# Patient Record
Sex: MALE | Race: WHITE
[De-identification: names, ages, dates, MRNs, and addresses within clinical notes are randomized per-mention and may not be internally consistent; named-entity substitution may affect disease eponyms.]

---

## 2018-02-27 NOTE — CT
CT CERVICAL SPINE NONCONTRAST:

2/27/18

 

HISTORY: 

Fall. Neck injury. 

 

FINDINGS:  

Vertebral body heights and alignment are maintained. Cervicothoracic junction is intact. There is two
 thirds shaft width inferior displacement of the posterior fragment of a C7 spinous process fracture.
 Margins of the fracture are well corticated, suggesting that this is an old injury. No other fractur
es are apparent. 

 

IMPRESSION:  

C7 sammy 's fracture, favored to be old. No unstable injury is apparent. 

 

 

 

POS: [facility]

## 2018-02-27 NOTE — CT
CT HEAD NONCONTRAST

2/27/18

 

HISTORY: 

Fall. Head injury. 

 

COMPARISON:  

10/26/10.

 

FINDINGS:  

There is no evidence of acute intracranial hemorrhage or infarct. The ventricles appear normal in siz
e, shape and position. There is no mass effect or shift of midline structures. Mucosal thickening wit
hin the right mastoid air cells and ethmoid air cells are apparent. There are air fluid levels within
 each maxillary sinus. 

 

IMPRESSION:  

1.      No acute intracranial abnormalities are demonstrated. 

2.      Bilateral maxillary sinusitis with chronic appearing pansinusitis. 

 

 

POS: [facility]

## 2018-03-03 NOTE — EKG
Test Reason : 

Blood Pressure : ***/*** mmHG

Vent. Rate : 104 BPM     Atrial Rate : 104 BPM

   P-R Int : 164 ms          QRS Dur : 088 ms

    QT Int : 356 ms       P-R-T Axes : [phone] degrees

   QTc Int : 468 ms

 

Sinus tachycardia with Premature atrial complexes

Otherwise normal ECG

 

Confirmed by JIESON HAWLEY M.D. ([id number]),  ULICES HANNA ([id number]) on 3/3/2018 1:41:55 PM

 

Referred By:             Confirmed By:JEISON HAWLEY M.D.

## 2018-11-17 NOTE — RAD
RADIOGRAPH CHEST 2 VIEWS:

 

Date: 11/17/18

Time: 1220 hours 

 

HISTORY: 

53-year-old male with productive cough for 1 week. 

 

COMPARISON: 

01/10/14. 

 

FINDINGS:

Previously, there was a mild broad infiltrate in the right lower lobe, and to a lesser degree in the 
left lower lobe, which have resolved. 

 

Now, there are new findings of a few flame-shaped small infiltrate-like nodular densities, two in the
 right upper lobe and questionably one in the right lower lung zone, as seen on the frontal view. On 
the lateral view, there is a new finding of a subtle small region of slightly increased attenuation a
t the posterior base of one of the lower lobes. There is no pleural effusion. Cardiomediastinal silho
uette is normal. No hilar enlargement. No pneumothorax. 

 

IMPRESSION:

A few small scattered nodular infiltrates. This is nonspecific, but it could represent multifocal mil
d bronchopneumonia. If the patient is immunocompromised, these could represent opportunistic infectio
ns. Clinical correlation is recommended. 

 

THUY [] 

 

POS: [facility]

## 2022-09-25 ENCOUNTER — HOSPITAL ENCOUNTER (INPATIENT)
Dept: HOSPITAL 92 - ERS | Age: 57
LOS: 2 days | Discharge: HOME | DRG: 196 | End: 2022-09-27
Attending: INTERNAL MEDICINE | Admitting: FAMILY MEDICINE
Payer: COMMERCIAL

## 2022-09-25 VITALS — BODY MASS INDEX: 28.7 KG/M2

## 2022-09-25 DIAGNOSIS — J96.22: ICD-10-CM

## 2022-09-25 DIAGNOSIS — E87.2: ICD-10-CM

## 2022-09-25 DIAGNOSIS — F17.210: ICD-10-CM

## 2022-09-25 DIAGNOSIS — Z20.822: ICD-10-CM

## 2022-09-25 DIAGNOSIS — I10: ICD-10-CM

## 2022-09-25 DIAGNOSIS — F32.A: ICD-10-CM

## 2022-09-25 DIAGNOSIS — J96.21: ICD-10-CM

## 2022-09-25 DIAGNOSIS — Z99.81: ICD-10-CM

## 2022-09-25 DIAGNOSIS — F10.10: ICD-10-CM

## 2022-09-25 DIAGNOSIS — I95.9: ICD-10-CM

## 2022-09-25 DIAGNOSIS — Z79.899: ICD-10-CM

## 2022-09-25 DIAGNOSIS — F41.9: ICD-10-CM

## 2022-09-25 DIAGNOSIS — Z79.52: ICD-10-CM

## 2022-09-25 DIAGNOSIS — Z90.49: ICD-10-CM

## 2022-09-25 DIAGNOSIS — Z91.041: ICD-10-CM

## 2022-09-25 DIAGNOSIS — E11.9: ICD-10-CM

## 2022-09-25 DIAGNOSIS — E78.5: ICD-10-CM

## 2022-09-25 DIAGNOSIS — J84.9: Primary | ICD-10-CM

## 2022-09-25 LAB
ALBUMIN SERPL BCG-MCNC: 3.5 G/DL (ref 3.5–5)
ALP SERPL-CCNC: 133 U/L (ref 40–110)
ALT SERPL W P-5'-P-CCNC: 13 U/L (ref 8–55)
ANALYZER IN CARDIO: (no result)
ANALYZER IN CARDIO: (no result)
ANION GAP SERPL CALC-SCNC: 21 MMOL/L (ref 10–20)
AST SERPL-CCNC: 26 U/L (ref 5–34)
BASE EXCESS STD BLDA CALC-SCNC: -3.6 MEQ/L
BASE EXCESS STD BLDA CALC-SCNC: -3.8 MEQ/L
BILIRUB SERPL-MCNC: 0.5 MG/DL (ref 0.2–1.2)
BUN SERPL-MCNC: 10 MG/DL (ref 8.4–25.7)
CA-I BLDA-SCNC: 1.05 MMOL/L (ref 1.12–1.3)
CA-I BLDA-SCNC: 1.09 MMOL/L (ref 1.12–1.3)
CALCIUM SERPL-MCNC: 8.6 MG/DL (ref 7.8–10.44)
CHLORIDE SERPL-SCNC: 99 MMOL/L (ref 98–107)
CO2 SERPL-SCNC: 20 MMOL/L (ref 22–29)
CREAT CL PREDICTED SERPL C-G-VRATE: 0 ML/MIN (ref 70–130)
GLOBULIN SER CALC-MCNC: 2.7 G/DL (ref 2.4–3.5)
GLUCOSE SERPL-MCNC: 168 MG/DL (ref 70–105)
HCO3 BLDA-SCNC: 23.8 MEQ/L (ref 22–28)
HCO3 BLDA-SCNC: 23.9 MEQ/L (ref 22–28)
HCT VFR BLDA CALC: 29 % (ref 42–52)
HCT VFR BLDA CALC: 31 % (ref 42–52)
HGB BLD-MCNC: 10.4 G/DL (ref 14–18)
HGB BLDA-MCNC: 10.4 G/DL (ref 14–18)
HGB BLDA-MCNC: 9.8 G/DL (ref 14–18)
MCH RBC QN AUTO: 26.6 PG (ref 27–31)
MCV RBC AUTO: 85.8 FL (ref 78–98)
MDIFF COMPLETE?: YES
O2 A-A PPRESDIFF RESPIRATORY: 176.57 MMHG (ref 0–20)
OVALOCYTES BLD QL SMEAR: (no result) (100X)
PCO2 BLDA: 55.7 MMHG (ref 35–45)
PCO2 BLDA: 55.8 MMHG (ref 35–45)
PH BLDA: 7.25 [PH] (ref 7.35–7.45)
PH BLDA: 7.25 [PH] (ref 7.35–7.45)
PLATELET # BLD AUTO: 439 THOU/UL (ref 130–400)
PO2 BLDA: 110.3 MMHG (ref 80–100)
PO2 BLDA: 138.9 MMHG (ref 80–100)
POLYCHROMASIA BLD QL SMEAR: (no result) (100X)
POTASSIUM BLD-SCNC: 3.34 MMOL/L (ref 3.7–5.3)
POTASSIUM BLD-SCNC: 3.6 MMOL/L (ref 3.7–5.3)
POTASSIUM SERPL-SCNC: 3.8 MMOL/L (ref 3.5–5.1)
RBC # BLD AUTO: 3.92 MILL/UL (ref 4.7–6.1)
SODIUM SERPL-SCNC: 136 MMOL/L (ref 136–145)
SP GR UR STRIP: 1.01 (ref 1–1.04)
SPECIMEN DRAWN FROM PATIENT: (no result)
SPECIMEN DRAWN FROM PATIENT: (no result)
STOMATOCYTES BLD QL SMEAR: (no result) (100X)
WBC # BLD AUTO: 9.2 THOU/UL (ref 4.8–10.8)

## 2022-09-25 PROCEDURE — 84145 PROCALCITONIN (PCT): CPT

## 2022-09-25 PROCEDURE — 3E043XZ INTRODUCTION OF VASOPRESSOR INTO CENTRAL VEIN, PERCUTANEOUS APPROACH: ICD-10-PCS | Performed by: EMERGENCY MEDICINE

## 2022-09-25 PROCEDURE — 83735 ASSAY OF MAGNESIUM: CPT

## 2022-09-25 PROCEDURE — 80048 BASIC METABOLIC PNL TOTAL CA: CPT

## 2022-09-25 PROCEDURE — 96375 TX/PRO/DX INJ NEW DRUG ADDON: CPT

## 2022-09-25 PROCEDURE — 36416 COLLJ CAPILLARY BLOOD SPEC: CPT

## 2022-09-25 PROCEDURE — 51702 INSERT TEMP BLADDER CATH: CPT

## 2022-09-25 PROCEDURE — 93005 ELECTROCARDIOGRAM TRACING: CPT

## 2022-09-25 PROCEDURE — 96365 THER/PROPH/DIAG IV INF INIT: CPT

## 2022-09-25 PROCEDURE — 87449 NOS EACH ORGANISM AG IA: CPT

## 2022-09-25 PROCEDURE — 82805 BLOOD GASES W/O2 SATURATION: CPT

## 2022-09-25 PROCEDURE — 78451 HT MUSCLE IMAGE SPECT SING: CPT

## 2022-09-25 PROCEDURE — 96376 TX/PRO/DX INJ SAME DRUG ADON: CPT

## 2022-09-25 PROCEDURE — 93970 EXTREMITY STUDY: CPT

## 2022-09-25 PROCEDURE — 87086 URINE CULTURE/COLONY COUNT: CPT

## 2022-09-25 PROCEDURE — 83605 ASSAY OF LACTIC ACID: CPT

## 2022-09-25 PROCEDURE — 87040 BLOOD CULTURE FOR BACTERIA: CPT

## 2022-09-25 PROCEDURE — 5A09357 ASSISTANCE WITH RESPIRATORY VENTILATION, LESS THAN 24 CONSECUTIVE HOURS, CONTINUOUS POSITIVE AIRWAY PRESSURE: ICD-10-PCS | Performed by: EMERGENCY MEDICINE

## 2022-09-25 PROCEDURE — 80053 COMPREHEN METABOLIC PANEL: CPT

## 2022-09-25 PROCEDURE — 71045 X-RAY EXAM CHEST 1 VIEW: CPT

## 2022-09-25 PROCEDURE — 85025 COMPLETE CBC W/AUTO DIFF WBC: CPT

## 2022-09-25 PROCEDURE — U0002 COVID-19 LAB TEST NON-CDC: HCPCS

## 2022-09-25 PROCEDURE — 80202 ASSAY OF VANCOMYCIN: CPT

## 2022-09-25 PROCEDURE — 36600 WITHDRAWAL OF ARTERIAL BLOOD: CPT

## 2022-09-25 PROCEDURE — 36556 INSERT NON-TUNNEL CV CATH: CPT

## 2022-09-25 PROCEDURE — 81003 URINALYSIS AUTO W/O SCOPE: CPT

## 2022-09-25 PROCEDURE — 84484 ASSAY OF TROPONIN QUANT: CPT

## 2022-09-25 PROCEDURE — 94640 AIRWAY INHALATION TREATMENT: CPT

## 2022-09-25 PROCEDURE — A9540 TC99M MAA: HCPCS

## 2022-09-25 PROCEDURE — 70450 CT HEAD/BRAIN W/O DYE: CPT

## 2022-09-25 PROCEDURE — 87324 CLOSTRIDIUM AG IA: CPT

## 2022-09-25 PROCEDURE — 85379 FIBRIN DEGRADATION QUANT: CPT

## 2022-09-25 PROCEDURE — 94660 CPAP INITIATION&MGMT: CPT

## 2022-09-25 PROCEDURE — 84100 ASSAY OF PHOSPHORUS: CPT

## 2022-09-25 PROCEDURE — 06HY33Z INSERTION OF INFUSION DEVICE INTO LOWER VEIN, PERCUTANEOUS APPROACH: ICD-10-PCS | Performed by: EMERGENCY MEDICINE

## 2022-09-25 PROCEDURE — 36415 COLL VENOUS BLD VENIPUNCTURE: CPT

## 2022-09-25 PROCEDURE — 96367 TX/PROPH/DG ADDL SEQ IV INF: CPT

## 2022-09-25 PROCEDURE — 83880 ASSAY OF NATRIURETIC PEPTIDE: CPT

## 2022-09-25 RX ADMIN — IPRATROPIUM BROMIDE SCH ML: 0.5 SOLUTION RESPIRATORY (INHALATION) at 21:38

## 2022-09-25 RX ADMIN — IPRATROPIUM BROMIDE SCH ML: 0.5 SOLUTION RESPIRATORY (INHALATION) at 18:06

## 2022-09-25 RX ADMIN — MOMETASONE FUROATE AND FORMOTEROL FUMARATE DIHYDRATE SCH: 200; 5 AEROSOL RESPIRATORY (INHALATION) at 18:07

## 2022-09-26 LAB
ANION GAP SERPL CALC-SCNC: 14 MMOL/L (ref 10–20)
BASOPHILS # BLD AUTO: 0 THOU/UL (ref 0–0.2)
BASOPHILS NFR BLD AUTO: 0 % (ref 0–1)
BUN SERPL-MCNC: 12 MG/DL (ref 8.4–25.7)
CALCIUM SERPL-MCNC: 8.1 MG/DL (ref 7.8–10.44)
CHLORIDE SERPL-SCNC: 96 MMOL/L (ref 98–107)
CO2 SERPL-SCNC: 29 MMOL/L (ref 22–29)
CREAT CL PREDICTED SERPL C-G-VRATE: 120 ML/MIN (ref 70–130)
EOSINOPHIL # BLD AUTO: 0 THOU/UL (ref 0–0.7)
EOSINOPHIL NFR BLD AUTO: 0 % (ref 0–10)
GLUCOSE SERPL-MCNC: 200 MG/DL (ref 70–105)
HGB BLD-MCNC: 7.7 G/DL (ref 14–18)
HGB BLD-MCNC: 9.4 G/DL (ref 14–18)
LYMPHOCYTES # BLD: 0.5 THOU/UL (ref 1.2–3.4)
LYMPHOCYTES NFR BLD AUTO: 5.6 % (ref 21–51)
MAGNESIUM SERPL-MCNC: 1.1 MG/DL (ref 1.6–2.6)
MCH RBC QN AUTO: 26.9 PG (ref 27–31)
MCV RBC AUTO: 86.6 FL (ref 78–98)
MONOCYTES # BLD AUTO: 0.2 THOU/UL (ref 0.11–0.59)
MONOCYTES NFR BLD AUTO: 2.5 % (ref 0–10)
NEUTROPHILS # BLD AUTO: 8.8 THOU/UL (ref 1.4–6.5)
NEUTROPHILS NFR BLD AUTO: 91.9 % (ref 42–75)
PLATELET # BLD AUTO: 273 THOU/UL (ref 130–400)
POTASSIUM SERPL-SCNC: 4 MMOL/L (ref 3.5–5.1)
RBC # BLD AUTO: 2.84 MILL/UL (ref 4.7–6.1)
SODIUM SERPL-SCNC: 135 MMOL/L (ref 136–145)
WBC # BLD AUTO: 9.6 THOU/UL (ref 4.8–10.8)

## 2022-09-26 RX ADMIN — VANCOMYCIN HYDROCHLORIDE SCH MLS: 10 INJECTION, POWDER, LYOPHILIZED, FOR SOLUTION INTRAVENOUS at 09:38

## 2022-09-26 RX ADMIN — VANCOMYCIN HYDROCHLORIDE SCH MLS: 10 INJECTION, POWDER, LYOPHILIZED, FOR SOLUTION INTRAVENOUS at 21:09

## 2022-09-26 RX ADMIN — IPRATROPIUM BROMIDE SCH ML: 0.5 SOLUTION RESPIRATORY (INHALATION) at 07:13

## 2022-09-26 RX ADMIN — IPRATROPIUM BROMIDE SCH ML: 0.5 SOLUTION RESPIRATORY (INHALATION) at 11:58

## 2022-09-26 RX ADMIN — HYDROCODONE BITARTRATE AND ACETAMINOPHEN PRN TAB: 5; 325 TABLET ORAL at 14:54

## 2022-09-26 RX ADMIN — MOMETASONE FUROATE AND FORMOTEROL FUMARATE DIHYDRATE SCH PUFF: 200; 5 AEROSOL RESPIRATORY (INHALATION) at 07:14

## 2022-09-26 RX ADMIN — INSULIN LISPRO PRN UNIT: 100 INJECTION, SOLUTION INTRAVENOUS; SUBCUTANEOUS at 11:17

## 2022-09-26 RX ADMIN — HYDROCODONE BITARTRATE AND ACETAMINOPHEN PRN TAB: 5; 325 TABLET ORAL at 11:06

## 2022-09-26 RX ADMIN — IPRATROPIUM BROMIDE SCH ML: 0.5 SOLUTION RESPIRATORY (INHALATION) at 19:23

## 2022-09-26 RX ADMIN — INSULIN LISPRO PRN UNIT: 100 INJECTION, SOLUTION INTRAVENOUS; SUBCUTANEOUS at 15:54

## 2022-09-26 RX ADMIN — INSULIN LISPRO PRN UNIT: 100 INJECTION, SOLUTION INTRAVENOUS; SUBCUTANEOUS at 21:27

## 2022-09-26 RX ADMIN — MOMETASONE FUROATE AND FORMOTEROL FUMARATE DIHYDRATE SCH PUFF: 200; 5 AEROSOL RESPIRATORY (INHALATION) at 19:24

## 2022-09-26 RX ADMIN — HYDROCODONE BITARTRATE AND ACETAMINOPHEN PRN TAB: 5; 325 TABLET ORAL at 21:25

## 2022-09-26 RX ADMIN — IPRATROPIUM BROMIDE SCH ML: 0.5 SOLUTION RESPIRATORY (INHALATION) at 02:24

## 2022-09-27 VITALS — TEMPERATURE: 98.7 F

## 2022-09-27 VITALS — DIASTOLIC BLOOD PRESSURE: 87 MMHG | SYSTOLIC BLOOD PRESSURE: 194 MMHG

## 2022-09-27 LAB
MAGNESIUM SERPL-MCNC: 2.1 MG/DL (ref 1.6–2.6)
VANCOMYCIN TROUGH SERPL-MCNC: 13.5 UG/ML

## 2022-09-27 RX ADMIN — IPRATROPIUM BROMIDE SCH ML: 0.5 SOLUTION RESPIRATORY (INHALATION) at 00:33

## 2022-09-27 RX ADMIN — MOMETASONE FUROATE AND FORMOTEROL FUMARATE DIHYDRATE SCH: 200; 5 AEROSOL RESPIRATORY (INHALATION) at 10:51

## 2022-09-27 RX ADMIN — IPRATROPIUM BROMIDE SCH: 0.5 SOLUTION RESPIRATORY (INHALATION) at 10:51

## 2022-09-27 RX ADMIN — VANCOMYCIN HYDROCHLORIDE SCH: 10 INJECTION, POWDER, LYOPHILIZED, FOR SOLUTION INTRAVENOUS at 09:13
